# Patient Record
Sex: FEMALE | Race: OTHER | NOT HISPANIC OR LATINO | URBAN - METROPOLITAN AREA
[De-identification: names, ages, dates, MRNs, and addresses within clinical notes are randomized per-mention and may not be internally consistent; named-entity substitution may affect disease eponyms.]

---

## 2024-06-02 ENCOUNTER — EMERGENCY (EMERGENCY)
Facility: HOSPITAL | Age: 64
LOS: 1 days | Discharge: HOME | End: 2024-06-02
Attending: EMERGENCY MEDICINE
Payer: COMMERCIAL

## 2024-06-02 VITALS
RESPIRATION RATE: 18 BRPM | HEIGHT: 66 IN | DIASTOLIC BLOOD PRESSURE: 74 MMHG | TEMPERATURE: 99 F | HEART RATE: 99 BPM | WEIGHT: 145.06 LBS | SYSTOLIC BLOOD PRESSURE: 130 MMHG | OXYGEN SATURATION: 98 %

## 2024-06-02 LAB — D DIMER BLD IA.RAPID-MCNC: <150 NG/ML DDU — SIGNIFICANT CHANGE UP

## 2024-06-02 PROCEDURE — 99283 EMERGENCY DEPT VISIT LOW MDM: CPT

## 2024-06-02 PROCEDURE — 85379 FIBRIN DEGRADATION QUANT: CPT

## 2024-06-02 PROCEDURE — 99284 EMERGENCY DEPT VISIT MOD MDM: CPT

## 2024-06-02 PROCEDURE — 36415 COLL VENOUS BLD VENIPUNCTURE: CPT

## 2024-06-02 NOTE — ED PROVIDER NOTE - PROGRESS NOTE DETAILS
D-dimer negative. Patient informed. Will have patient follow up with pcp. Pt is well appearing walking with steady gait, stable for discharge and follow up without fail with medical doctor. I had a detailed discussion with the patient and/or guardian regarding the historical points, exam findings, and any diagnostic results supporting the discharge diagnosis. Pt educated on care and need for follow up. Strict return instructions and red flag signs and symptoms discussed with patient. Questions answered. Pt shows understanding of discharge information and agrees to follow. Informed by nurses that the vascular tech is not in house. Patient informed. Will perform d-dimer. Patient  understandably upset but will wait for the blood test result. Offered prophylactic Lovenox. Declined at this time.

## 2024-06-02 NOTE — ED PROVIDER NOTE - OBJECTIVE STATEMENT
63-year-old female with no past medical history presents with pain to her left inner thigh that began today while she was on a flight home from [place].  Patient describes it as a throbbing sensation.  States that she is concerned about having a blood clot.  Took ibuprofen with some relief.  Denies any redness or swelling. Denies SOB or difficulty breathing.

## 2024-06-02 NOTE — ED ADULT NURSE REASSESSMENT NOTE - NS ED NURSE REASSESS COMMENT FT1
RN was informed by DANA Guadarrama that MD Kang spoke with the regular ultrasound tech and tech agreed to perform the DVT study. RN contacted tech to bring pt over. As per ultrasound tech, transport request is placed for the pt and pt is next in line. Pt was then updated on the status, however, pt became even more upset about the miscommunication on imaging and refused the test. Pt was then given her results and discharged at 0750pm by DANA Guadarrama. She left ambulatory in no distress. RN was informed by DANA Guadarrama that MD Kang spoke with the regular ultrasound tech and tech agreed to perform the DVT study. RN contacted tech to bring pt over. As per ultrasound tech, transport request is placed for the pt and pt is next in line. Pt was then updated on the status, however, pt became even more upset about the miscommunication on imaging and refused the test. Pt was then given her results and discharged at 0750pm by DANA Guadarrama. She left ambulatory in no distress.  KEN Aguilera, MEI Schultz, and MEI Moreno made aware of situation.

## 2024-06-02 NOTE — ED PROVIDER NOTE - CLINICAL SUMMARY MEDICAL DECISION MAKING FREE TEXT BOX
63-year-old female with no past medical history presents with pain to her left inner thigh that began today while she was on a flight home from [place].  Patient describes it as a throbbing sensation.  States that she is concerned about having a blood clot.  Took ibuprofen with some relief.  Denies any redness or swelling. Denies SOB or difficulty breathing.     Will obtain doppler to r/o dvt.

## 2024-06-02 NOTE — ED ADULT NURSE REASSESSMENT NOTE - NS ED NURSE REASSESS COMMENT FT1
Attempted to contact vascular to bring pt over and unable to reach. Ultrasound tech was also unreachable at this time. DANA Guadarrama and MD Kang informed. Will order d-dimer per provider. Blood test performed and apple juice was offered to pt during blood draw per pt's request. Attempted to contact vascular to bring pt over and unable to reach. Ultrasound tech was also unreachable at this time. DANA Guadarrama and MD Kang informed. Will order d-dimer per provider. Pt updated on status, became upset, but agreed to blood test. Blood test performed and apple juice was offered to pt during blood draw per pt's request.

## 2024-06-02 NOTE — ED ADULT NURSE NOTE - NSFALLUNIVINTERV_ED_ALL_ED
Bed/Stretcher in lowest position, wheels locked, appropriate side rails in place/Call bell, personal items and telephone in reach/Instruct patient to call for assistance before getting out of bed/chair/stretcher/Non-slip footwear applied when patient is off stretcher/[place] to call system/Physically safe environment - no spills, clutter or unnecessary equipment/Purposeful proactive rounding/Room/bathroom lighting operational, light cord in reach

## 2024-06-02 NOTE — ED PROVIDER NOTE - PHYSICAL EXAMINATION
Left upper thigh - tenderness to the medial aspect of upper thigh. no swelling, erythema. Palpable pedal pulse.

## 2024-06-02 NOTE — ED PROVIDER NOTE - PATIENT PORTAL LINK FT
You can access the FollowMyHealth Patient Portal offered by [facility] by registering at the following website: [email or web address]. By joining [employer]’s FollowMyHealth portal, you will also be able to view your health information using other applications (apps) compatible with our system.

## 2024-06-02 NOTE — ED PROVIDER NOTE - NSFOLLOWUPINSTRUCTIONS_ED_ALL_ED_FT
Follow up with your primary care doctor within 1 week.     You can take Motrin (ibuprofen) 600 mg every 6 hours or Tylenol (acetaminophen) 1000 mg every 6 hours as needed for pain or fever.     If you experience any new or worsening symptoms or if you are concerned you can always come back to the emergency for a re-evaluation.

## 2024-06-02 NOTE — ED ADULT NURSE NOTE - OBJECTIVE STATEMENT
Patient presented to ED with left inner thigh pain started to today, patient stated flight back today from [place], pt stated not on any blood thinner.